# Patient Record
Sex: FEMALE | Race: WHITE | NOT HISPANIC OR LATINO | Employment: UNEMPLOYED | URBAN - METROPOLITAN AREA
[De-identification: names, ages, dates, MRNs, and addresses within clinical notes are randomized per-mention and may not be internally consistent; named-entity substitution may affect disease eponyms.]

---

## 2022-04-14 ENCOUNTER — HOSPITAL ENCOUNTER (EMERGENCY)
Facility: HOSPITAL | Age: 47
Discharge: HOME | End: 2022-04-14
Attending: EMERGENCY MEDICINE | Admitting: EMERGENCY MEDICINE

## 2022-04-14 ENCOUNTER — APPOINTMENT (OUTPATIENT)
Dept: GENERAL RADIOLOGY | Facility: HOSPITAL | Age: 47
End: 2022-04-14

## 2022-04-14 VITALS
HEART RATE: 82 BPM | RESPIRATION RATE: 16 BRPM | TEMPERATURE: 99.4 F | WEIGHT: 188.49 LBS | OXYGEN SATURATION: 100 % | BODY MASS INDEX: 35.59 KG/M2 | HEIGHT: 61 IN

## 2022-04-14 DIAGNOSIS — S71.111A LACERATION OF RIGHT THIGH, INITIAL ENCOUNTER: Primary | ICD-10-CM

## 2022-04-14 DIAGNOSIS — S80.811A ABRASION OF RIGHT LOWER EXTREMITY, INITIAL ENCOUNTER: ICD-10-CM

## 2022-04-14 PROCEDURE — 73610 X-RAY EXAM OF ANKLE: CPT

## 2022-04-14 PROCEDURE — 90471 IMMUNIZATION ADMIN: CPT | Performed by: NURSE PRACTITIONER

## 2022-04-14 PROCEDURE — 25010000002 TETANUS-DIPHTH-ACELL PERTUSSIS 5-2.5-18.5 LF-MCG/0.5 SUSPENSION PREFILLED SYRINGE: Performed by: NURSE PRACTITIONER

## 2022-04-14 PROCEDURE — 73560 X-RAY EXAM OF KNEE 1 OR 2: CPT

## 2022-04-14 PROCEDURE — 90715 TDAP VACCINE 7 YRS/> IM: CPT | Performed by: NURSE PRACTITIONER

## 2022-04-14 PROCEDURE — 99282 EMERGENCY DEPT VISIT SF MDM: CPT

## 2022-04-14 RX ORDER — LIDOCAINE HYDROCHLORIDE AND EPINEPHRINE 10; 10 MG/ML; UG/ML
10 INJECTION, SOLUTION INFILTRATION; PERINEURAL ONCE
Status: COMPLETED | OUTPATIENT
Start: 2022-04-14 | End: 2022-04-14

## 2022-04-14 RX ADMIN — LIDOCAINE HYDROCHLORIDE,EPINEPHRINE BITARTRATE 10 ML: 10; .01 INJECTION, SOLUTION INFILTRATION; PERINEURAL at 22:50

## 2022-04-14 RX ADMIN — TETANUS TOXOID, REDUCED DIPHTHERIA TOXOID AND ACELLULAR PERTUSSIS VACCINE, ADSORBED 0.5 ML: 5; 2.5; 8; 8; 2.5 SUSPENSION INTRAMUSCULAR at 22:53

## 2022-04-15 NOTE — DISCHARGE INSTRUCTIONS
Keep wound clean and dry.    Sutures out in 7 to 10 days.    Follow-up with PCP or return here for removal.    Tylenol or Motrin as needed for pain.    Return for new or worsening symptoms

## 2022-04-15 NOTE — ED PROVIDER NOTES
Time: 22:47 EDT  Arrived by: Vehicle  Chief Complaint: Right leg laceration  History provided by: Patient  History is limited by: N/A    History of Present Illness:  Patient is a 46 y.o. year old female that presents to the emergency department with right leg laceration      Laceration  Location:  Leg  Leg laceration location:  R upper leg  Length:  1  Depth:  Cutaneous  Quality: straight    Bleeding: controlled    Time since incident:  3 hours  Laceration mechanism:  Broken glass (Cooking with apple cider and the bottle dropped and glass shattered spraying everywhere)  Pain details:     Quality:  Burning and aching    Severity:  Moderate    Timing:  Constant    Progression:  Unchanged  Foreign body present:  Unable to specify (Possible glass)  Relieved by:  Nothing  Worsened by:  Movement and pressure  Ineffective treatments:  Certain positions  Tetanus status:  Out of date  Associated symptoms: no fever, no focal weakness, no numbness, no redness, no swelling and no streaking          Similar Symptoms Previously: No    Recently seen: [facility] to [facility].  Had a negative x-ray did not show foreign body but sent here for further evaluation      Patient Care Team  Primary Care Provider: None    Past Medical History:     Allergies   Allergen Reactions   • Codeine Unknown - High Severity     Chest pain      Past Medical History:   Diagnosis Date   • Hypertension      Past Surgical History:   Procedure Laterality Date   • CHOLECYSTECTOMY     • WISDOM TOOTH EXTRACTION       History reviewed. No pertinent family history.    Home Medications:  Prior to Admission medications    Medication Sig Start Date End Date Taking? Authorizing Provider   telmisartan-hydrochlorothiazide (MICARDIS HCT) 80-12.5 MG per tablet Take 1 tablet by mouth Daily.    Emergency, Nurse Tiburcio, RN        Social History:   PT  reports that she has never smoked. She has never used smokeless tobacco. She reports that she does not drink alcohol and does not  "use drugs.    Record Review:  I have reviewed the patient[facility].     Review of Systems  Review of Systems   Constitutional: Negative for fever.   Respiratory: Negative for shortness of breath.    Cardiovascular: Negative for leg swelling.   Gastrointestinal: Negative for nausea.   Musculoskeletal: Positive for gait problem ( Pain in right leg). Negative for arthralgias, back pain and joint swelling.   Skin: Positive for wound ( Right thigh).   Neurological: Negative for focal weakness, weakness and numbness.   Hematological: Negative.    Psychiatric/Behavioral: Negative.         Physical Exam  Pulse 82   Temp 99.4 °F (37.4 °C) (Oral)   Resp 16   Ht 153.7 cm (60.5\")   Wt 85.5 kg (188 lb 7.9 oz)   LMP  (LMP Unknown)   SpO2 100%   BMI 36.21 kg/m²     Physical Exam  Vitals and nursing note reviewed.   Constitutional:       Appearance: Normal appearance.   HENT:      Head: Atraumatic.   Cardiovascular:      Pulses: Normal pulses.   Pulmonary:      Effort: Pulmonary effort is normal.   Musculoskeletal:         General: Tenderness ( Right medial mid and lower thigh at laceration and Rajen site) and signs of injury ( Laceration and abrasion to right thigh) present. No swelling.      Cervical back: Normal range of motion.   Skin:     Capillary Refill: Capillary refill takes less than 2 seconds.      Comments: Small 1 cm laceration that mostly is superficial with a central possible puncture right medial lower thigh with linear abrasion extended from with controlled eating   Neurological:      Mental Status: She is alert.      Sensory: No sensory deficit.      Motor: No weakness.   Psychiatric:         Mood and Affect: Mood normal.         Behavior: Behavior normal.          ED Course  Pulse 82   Temp 99.4 °F (37.4 °C) (Oral)   Resp 16   Ht 153.7 cm (60.5\")   Wt 85.5 kg (188 lb 7.9 oz)   LMP  (LMP Unknown)   SpO2 100%   BMI 36.21 kg/m²   No results found for this or any previous visit.  Medications "   Tetanus-Diphth-Acell Pertussis (BOOSTRIX) injection 0.5 mL (has no administration in time range)   lidocaine 1% - EPINEPHrine 1:[id number] (XYLOCAINE W/EPI) 1 %-1:761343 injection 10 mL (has no administration in time range)     XR Femur 2 View Right    Result Date: 4/14/2022  Narrative: PROCEDURE: XR FEMUR 2 VW RIGHT  COMPARISON: None  INDICATIONS: Patient with possible glass in right femur  FINDINGS:  BONES: Normal.  No significant arthropathy or acute abnormality.  SOFT TISSUES: Negative.  No visible soft tissue swelling.  EFFUSION: None visible.  OTHER: No unexpected radiopaque foreign body is identified.      Impression:   1. No unexpected radiopaque foreign body identified. 2. No acute fracture or traumatic malalignment identified.      LISA SWEENEY MD       Electronically Signed and Approved By: LISA SWEENEY MD on 4/14/2022 at 20:47             XR Knee 1 or 2 View Right    Result Date: 4/14/2022  Narrative: PROCEDURE: XR KNEE 1 OR 2 VW RIGHT  COMPARISON: None.  INDICATIONS: LACERATION TO MEDIAL RIGHT KNEE.  FINDINGS: Two views were obtained.  No acute fracture or acute malalignment is identified.  No retained radiopaque foreign body is appreciated.  No joint effusion is suggested.  If symptoms or clinical concerns persist, consider imaging follow-up.      Impression:  No acute fracture or acute malalignment is identified.     COMMENT:  Part of this note is an electronic transcription of spoken language to printed text. The electronic translation/transcription may permit erroneous, or at times, nonsensical (or even sensical) words or phrases to be inadvertently transcribed or omitted; this  has reviewed the note for such errors (as well as additional errors); however, some may still exist.  KIM SHEIKH JR, MD       Electronically Signed and Approved By: KIM SHEIKH JR, MD on 4/14/2022 at 22:39              XR Ankle 3+ View Right    Result Date: 4/14/2022  Narrative: PROCEDURE: XR ANKLE 3+  VW RIGHT  COMPARISON: None.  INDICATIONS: LACERATION TO MEDIAL RIGHT ANKLE.  FINDINGS: 3 views were obtained.  No acute fracture or acute malalignment is identified.  No retained radiopaque foreign body is appreciated.  If symptoms or clinical concerns persist, consider imaging follow-up.      Impression:  No acute fracture or acute malalignment is identified.     COMMENT:  Part of this note is an electronic transcription of spoken language to printed text. The electronic translation/transcription may permit erroneous, or at times, nonsensical (or even sensical) words or phrases to be inadvertently transcribed or omitted; this  has reviewed the note for such errors (as well as additional errors); however, some may still exist.  KIM SHEIKH JR, MD       Electronically Signed and Approved By: KIM SHEIKH JR, MD on 4/14/2022 at 22:39                Procedures/EKGs:  Laceration Repair    Date/Time: 4/14/2022 10:26 PM  Performed by: Frances Zayas APRN  Authorized by: Thuan Merino MD     Consent:     Consent obtained:  Verbal    Consent given by:  Patient    Risks, benefits, and alternatives were discussed: yes      Risks discussed:  Infection, pain, poor cosmetic result and retained foreign body    Alternatives discussed:  No treatment  Universal protocol:     Patient identity confirmed:  Verbally with patient  Anesthesia:     Anesthesia method:  Local infiltration    Local anesthetic:  Lidocaine 1% WITH epi  Laceration details:     Length (cm):  1.3    Depth (mm):  2  Pre-procedure details:     Preparation:  Patient was prepped and draped in usual sterile fashion and imaging obtained to evaluate for foreign bodies  Exploration:     Limited defect created (wound extended): no      Hemostasis achieved with:  Epinephrine    Imaging obtained: bedside ultrasound and x-ray      Imaging outcome: foreign body not noted      Wound exploration: wound explored through full range of motion      Contaminated: no     Skin repair:     Repair method:  Sutures    Suture size:  4-0    Suture material:  Nylon    Suture technique:  Simple interrupted    Number of sutures:  3  Approximation:     Approximation:  Close  Repair type:     Repair type:  Simple  Post-procedure details:     Dressing:  Non-adherent dressing    Procedure completion:  Tolerated      Medical Decision Making:                     MDM  Number of Diagnoses or Management Options  Abrasion of right lower extremity, initial encounter  Laceration of right thigh, initial encounter  Diagnosis management comments: The patient presented with a laceration in need of repair. See laceration repair note for details. The wound was irrigated with normal saline irrigation.  3 sutures were used to approximate the wound edges. Tetanus was given. The patient tolerated the procedure well. Acute bleeding has ceased and the wound was approximated in the emergency department. Patient was counseled to keep the wound clean, dry, and out of the sun. Patient was counseled to change dressings daily. Patient was advised to return to the ED for worsening erythema, pain, swelling, fever, excessive drainage or signs of infection. They were counseled to follow up for suture removal as described in the discharge instructions. Patient verbalizes understanding and agrees to follow up as instructed.         Amount and/or Complexity of Data Reviewed  Tests in the radiology section of CPT®: reviewed and ordered  Tests in the medicine section of CPT®: ordered and reviewed  Review and summarize past medical records: yes (Reviewed [facility] note and imaging)    Risk of Complications, Morbidity, and/or Mortality  Presenting problems: low  Diagnostic procedures: low  Management options: low    Patient Progress  Patient progress: stable       Final diagnoses:   Laceration of right thigh, initial encounter   Abrasion of right lower extremity, initial encounter        Disposition:  ED Disposition     ED  Disposition   Discharge    Condition   Stable    Comment   --              Frances Zayas, APRN  04/14/22 [id number]